# Patient Record
Sex: MALE | Race: WHITE | NOT HISPANIC OR LATINO | Employment: OTHER | URBAN - METROPOLITAN AREA
[De-identification: names, ages, dates, MRNs, and addresses within clinical notes are randomized per-mention and may not be internally consistent; named-entity substitution may affect disease eponyms.]

---

## 2023-01-01 ENCOUNTER — PRE VISIT (OUTPATIENT)
Dept: TRANSPLANT | Facility: CLINIC | Age: 83
End: 2023-01-01
Payer: MEDICARE

## 2023-01-01 ENCOUNTER — VIRTUAL VISIT (OUTPATIENT)
Dept: TRANSPLANT | Facility: CLINIC | Age: 83
End: 2023-01-01
Attending: INTERNAL MEDICINE
Payer: MEDICARE

## 2023-01-01 ENCOUNTER — PATIENT OUTREACH (OUTPATIENT)
Dept: ONCOLOGY | Facility: CLINIC | Age: 83
End: 2023-01-01
Payer: MEDICARE

## 2023-01-01 ENCOUNTER — TRANSCRIBE ORDERS (OUTPATIENT)
Dept: OTHER | Age: 83
End: 2023-01-01

## 2023-01-01 ENCOUNTER — PATIENT OUTREACH (OUTPATIENT)
Dept: ONCOLOGY | Facility: CLINIC | Age: 83
End: 2023-01-01

## 2023-01-01 ENCOUNTER — NURSE TRIAGE (OUTPATIENT)
Dept: ONCOLOGY | Facility: CLINIC | Age: 83
End: 2023-01-01
Payer: MEDICARE

## 2023-01-01 DIAGNOSIS — D64.9 ANEMIA, UNSPECIFIED TYPE: ICD-10-CM

## 2023-01-01 DIAGNOSIS — C83.18 MANTLE CELL LYMPHOMA OF LYMPH NODES OF MULTIPLE SITES (H): Primary | ICD-10-CM

## 2023-01-01 DIAGNOSIS — E83.111 IRON OVERLOAD DUE TO REPEATED RED BLOOD CELL TRANSFUSIONS: ICD-10-CM

## 2023-01-01 DIAGNOSIS — Z94.81 HISTORY OF BONE MARROW TRANSPLANT (H): ICD-10-CM

## 2023-01-01 DIAGNOSIS — R79.89 HIGH SERUM FERRITIN: Primary | ICD-10-CM

## 2023-01-01 DIAGNOSIS — Z85.72 HISTORY OF LYMPHOMA: ICD-10-CM

## 2023-01-01 PROCEDURE — G0463 HOSPITAL OUTPT CLINIC VISIT: HCPCS | Mod: PN,GT | Performed by: INTERNAL MEDICINE

## 2023-01-01 PROCEDURE — 99205 OFFICE O/P NEW HI 60 MIN: CPT | Mod: VID | Performed by: INTERNAL MEDICINE

## 2023-01-01 RX ORDER — DILTIAZEM HYDROCHLORIDE 120 MG/1
120 CAPSULE, EXTENDED RELEASE ORAL
COMMUNITY
Start: 2023-01-01

## 2023-01-01 RX ORDER — THYROID 60 MG/1
60 TABLET ORAL
COMMUNITY
Start: 2023-01-01

## 2023-01-01 RX ORDER — FUROSEMIDE 20 MG
40 TABLET ORAL
COMMUNITY
Start: 2023-01-01 | End: 2024-01-24

## 2023-01-12 NOTE — PROGRESS NOTES
[facility]: Cancer Care                                                                                          Received voice mail message from pt's wife, Jacque, requesting a call back as they would like a second opinion (or consult) with Dr. Rigoberto Wade about Luther.  She also stated she has written a letter with request and asking if she should mail it (needs up to dated address) or e-mail.  Not clear if pt needs to be seen by malignant or benign hematologist.    Per chart review with Care Everywhere, pt seeing Dr. Ulysses Gardner, [facility] in [place].  Per Dr. 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  Iron overload secondary to frequent transfusions.    Per a 1/21/2010 note by Dr. Yadiel Perry, pt was, at that time 4 yrs post autologous transplant for an aggressive mantle cell lymphoma.    RN sent high priority message to Cancer Nurse Navigation team and copied Dr. Rigoberto Wade though do not see an note from him in chart.    Signature:  Nadine Rivers, YADI, OCN

## 2023-01-17 NOTE — PROGRESS NOTES
Writer SAMIR for pt's wife at number provided: [phone].  Indicated call back number to discuss referral further  Nallely Perez, RN, BSN, OCN  Hematology/Oncology Nurse Navigator  [facility]  [phone] / [phone]

## 2023-02-09 NOTE — TELEPHONE ENCOUNTER
Action 2023 9:11 AM ABT   Action Taken Called and spoke to Hoda with [facility] imaging team, she states that she will push all [id number] PET and CT Abd Pel to  PACS     RECORDS STATUS - ALL OTHER DIAGNOSIS      RECORDS RECEIVED FROM: [facility]   DATE RECEIVED:    NOTES STATUS DETAILS   OFFICE NOTE from referring provider Self    OFFICE NOTE from medical oncologist On license of [facility] 23: Dr. Ulysses Gardner   OFFICE NOTE from other specialist     OPERATIVE REPORT On license of [facility] 17, 05, 05: Bone Marrow biopsy   MEDICATION LIST On license of [facility]    LABS     PATHOLOGY REPORTS Req -[facility]  [employer] 17: [id number]  05: [id number]  05: [id number]   ANYTHING RELATED TO DIAGNOSIS CE-[facility] Most recent 23   IMAGING (NEED IMAGES & REPORT)     CT SCANS PACS [facility]:  22-18: CT Chest Abd Pel  09/15/22: CT Pelvis  18: CT Abd Pel   PET PACS [facility]:  22-17: NM PET CT Skull

## 2023-02-09 NOTE — PROGRESS NOTES
Writer placed call to Jacque, wife, who called in referral for Luther. He previously saw Micaela Perry and Mauro for mantle cell lymphoma but has since transferred care to local provider within the [facility] system. Has been recently transfusion dependent and now with significant iron overload. Jacque is asking to see Dr Wade again as he previously treated Luther.    Hold placed on 2/13 at 3:00 pm for 60 minutes with Dr Wade and message to care team to ask if ok for virtual for initial visit. Will update scheduling instructions based on response.

## 2023-02-13 NOTE — LETTER
Date:February 14, 2023      Patient was self referred, no letter generated. Do not send.        [facility] Health Information

## 2023-02-13 NOTE — NURSING NOTE
Pharmacy should be [facility] in [place], [place]    Is the patient currently in the state of [place]? YES    Visit mode:VIDEO    If the visit is dropped, the patient can be reconnected by: VIDEO VISIT: Text to cell phone: [phone]    Will anyone else be joining the visit? YES: How would they like to receive their invitation?       How would you like to obtain your AVS? MyChart    Are changes needed to the allergy or medication list? YES: Add  to medication - Deferasirox    Comments or concerns regarding today's visit: MARGARITO Viera  2/13/2023

## 2023-02-13 NOTE — LETTER
2/13/2023         RE: Luther Witt  [address]  [place] [address]        Dear Colleague,    Thank you for referring your patient, Luther Witt, to the [facility] [place]. Please see a copy of my visit note below.    Video-Visit Details    Type of service:  Video Visit    Video Start Time (time video started): 300    Video End Time (time video stopped): 400    Originating Location (pt. Location): home        Distant Location (provider location):  [facility] [place] [facility]    Mode of Communication:  Video Conference via [facility]       Hematology/Oncology Consult Note    Luther Witt MRN# [id number]   Age: 82 year old YOB: 1940          Reason for Consult:   Iron overload         Assessment and Plan:   Luther Witt is a 82 year old   ASSESSMENT:    1.  Refractory anemia, transfusion dependent.  2.  Iron overload secondary to transfusions.  3.  Mantle cell lymphoma, status post autologous bone marrow transplant, status post relapse treatment with acalabrutinib, Rituxan, bendamustine and obinutuzumab now in CR.  4.  Congestive heart failure.  5.  Stage IIIB chronic kidney disease.  6.  Hypotestosteronemia.  7.  Hip pain.  8.  Weight loss.  9.  Memory issues.  10.  Severe fatigue.  11. Thrombocytopenia      PLAN:    It was nice to see Luther and Jacque, but clearly he has markedly deteriorated.  He has remarkable fatigue and brain fog, memory deficits and he is transfusion dependent.  I just am not sure why is he so anemic.  Is this an aplastic process, is it an MDS process or is it an immune process?  I did note that at one point, he did have a positive ARIELLA.  His bilirubin has not been up that high and I could not find a retic count.  I would like to ask Dr. Zhu to get a ARIELLA, a retic count and possibly a bone marrow.  I am very suspicious that he has MDS given all of the treatments that he has had with alkylating agents and radiation, etc.  I  wonder whether or not a peripheral blood NGS might be done as well as a peripheral blood flow cytometry again.His mantle cell lymphoma seems to be in CR  In terms of his iron overload, there just are not that many good options.  He has CKD.  He is doing that deferasirox, but that small amount of deferasirox is overwhelmed by 2 units of packed red blood cells every 2 weeks.  He receives over 500 mg of iron with the red cell transfusions.  So, asking the iron chelator to try to remove that much is just not going to happen, especially since the deferasirox is affecting his kidney and the clearance of the deferasirox iron chelate is going to be affected.  I am going to give Dr. Zhu a call, and we will set up another appointment with Luther and see whether or not we can do anything about his refractory anemia.  I cannot tell if he is receiving darbepoetin anymore.  He certainly has CKD and could have a low erythropoietin, and I would think that erythropoietin may be helpful to him.  He should also get folate and try to avoid any iron supplements.        Thank you for involving us in the care of this patient.   I spent a total of 60 minutes face to face with Luther Witt during today's office visit. Over 50% of this time was spent counseling the patient and coordinating the care regarding refractory anemia and iron overload  and 40 minutes preparing to see the patient and  care coordination     Gabino Wade MD  [phone]         History of Present Illness:   History obtained from chart review and confirmed with patient.  HISTORY OF PRESENT ILLNESS:  I had the pleasure of seeing Luther Witt for evaluation of mantle cell lymphoma, refractory anemia and iron overload.  Luther is an 82-year-old gentleman who developed lymphadenopathy and splenomegaly in 04/2005.  He was diagnosed with mantle cell lymphoma.  He received 6 cycles of CHOP chemotherapy and after chemotherapy, he underwent an autologous bone  marrow transplant using Cytoxan and radiation.  He did well until 2017 when a bone marrow biopsy again showed mantle cell lymphoma.  It was a normal cellular bone marrow with 20-30% cellularity and actually decreased storage iron and 31% involvement with lymphoma.  He was treated with bendamustine and rituximab.  PET scan showed a complete response.  In 03/2021, he again showed progression of his lymphoma.  He was treated with acalabrutinib and then subsequently with bendamustine and obinutuzumab from 06/2021 through 07/2022.  He developed transfusion-dependent anemia back in 07/2021, and has had 1-2 units of red cells every couple of weeks.  He was getting subcutaneous darbepoetin back in 02/2022.  He did develop some congestive heart failure and pleural effusions with a decreased ejection fraction of 41%.  Restaging CT in 07/2022 showed that he had a left pleural effusion as well as a right pleural effusion.  This was tapped, but no lymphoma was found.  A PET/CT in 09/2022 showed no evidence of metastatic disease.  He was hospitalized with heart failure in 11/2022.  He was noted to have an elevated ferritin of 3242, and has been followed by Dr. Zhu with regular transfusions.  He has also had some mild CKD with creatinine up to 1.85, and decreased GFR below 30.  He has been treated with deferasirox 360 mg as Jadenu sprinkles.  He also has been given some testosterone.  Of note, 3 years ago a NGS for myeloid neoplasms was done with a broad sequencing analysis and no abnormal mutations consistent with MDS were found.  I cannot see that he has had it done recently.  Flow cytometry has been done, which did not show any evidence of peripheralized lymphoma.  A recent CBC showed that his hemoglobin was 7.4, MCV of 102, platelet count 85,000 with 88% neutrophils, 3% lymphocytes, 6% monocytes and 0.2% eosinophils.  His ferritin on 02/11/2023 was 4985.             Review of Systems:   A comprehensive ROS was performed  with the patient and was found to be negative with the exception of that noted in the HPI above.  CONSTITUTIONAL Weakness, fatigue  and wt loss 30 lb over last few months Very sleepy  EYES no cataracts  EARS poor hearing  SINUS No rhinitis  RESP  WATKINS  And SOB at rest no sleep apnea  Has a cough  ID Had COVID 2 months  COR CHF with decreased EF 41% No angina  GI no heart burn no diarrhea no constipations No bleeding or melena  MS No arthritis no joint pain  NEURO No headaches. Memory issues , forgetting  PSYCH mood ok  ENDO No DM Has low testosterone  r   Mantle cell lymphoma  Auto BMT  CHF  CKD           Past Surgical History:   No surgery         Social History:   No smoke or drink  Social History     Socioeconomic History     Marital status:      Spouse name: Not on file     Number of children: Not on file     Years of education: Not on file     Highest education level: Not on file   Occupational History     Not on file   Tobacco Use     Smoking status: Never     Smokeless tobacco: Never   Vaping Use     Vaping Use: Never used   Substance and Sexual Activity     Alcohol use: Not on file     Drug use: Not on file     Sexual activity: Not on file   Other Topics Concern     Not on file   Social History Narrative     Not on file     Social Determinants of Health     Financial Resource Strain: Not on file   Food Insecurity: Not on file   Transportation Needs: Not on file   Physical Activity: Not on file   Stress: Not on file   Social Connections: Not on file   Intimate Partner Violence: Not on file   Housing Stability: Not on file            Family History:   Family hx of cancer unknown type in brothers   No Diabetes   Mother had Afib  No family history of iron overload         Allergies:   No Known Allergies         Medications:   (Not in a hospital admission)           Physical Exam:   PHYSICAL EXAMINATION:  By the video, he is a fatigued gentleman,dozing not very verbal, in no acute distress.     EYES:   Grossly normal to inspection, no discharge, erythema or icterus.   SKIN:  Visible skin is clear.  No significant rash or abnormal pigmentation.   NEUROLOGIC:  Cranial nerves seem to be intact.  Mentation and speech sounds faigued   PSYCHIATRIC:  Mentation appears sluggish.          Data:   I have personally reviewed the following labs/imaging:  CBC@LABRCNTIPR(wbc:4,rbc:4,hgb:4,hct:4,mcv:4,mch:4,mchc:4,rdw:4,plt:4)@  CMP@LABRCNTIPR(na:4,potassium:4,chloride:4,co2:4,aniongap:4,g,bun:4,cr:4,gfrestimated:4,gfrestblack:4,sherif:4,ma,phos:4,prottotal:4,albumin:4,bilitotal:4,alkphos:4,ast:4,alt:4)@  INR@LABRCNTIPR(inr:4)@        Again, thank you for allowing me to participate in the care of your patient.        Sincerely,        Gabino Wade MD

## 2023-02-13 NOTE — LETTER
Date:February 14, 2023      Provider requested that no letter be sent. Do not send.       [facility]

## 2023-02-13 NOTE — PROGRESS NOTES
[facility]: Cancer Care                                                                                          Per request of Dr. Rigoberto Wade reached out to [facility] to assist with connecting with Dr. Ulysses Gardner.  Per message from Saniya nurse, Dr. Gardner out of clinic this week and next week.  Can call back after that if still need to talk with Dr. Gardner.    Late entry on on 2/15/23:  Gave nurse, at [facility], Dr. Rigoberto Wade's cell number to assist with Micaela Gardner and Mauro connecting on Dr. Gardner return.      Updated Dr. Wade on 2/13/23.    Dr. Rigoberto Wade's consult visit with pt was virtual on 2/213/23, so author did not have opportunity to meet pt/wife.    Signature:  Nadine Rivers, RN, OCN

## 2023-02-13 NOTE — PROGRESS NOTES
Video-Visit Details    Type of service:  Video Visit    Video Start Time (time video started): 300    Video End Time (time video stopped): 400    Originating Location (pt. Location): home        Distant Location (provider location):  [facility] [place]    Mode of Communication:  Video Conference via [facility]       Hematology/Oncology Consult Note    Luther Witt MRN# [id number]   Age: 82 year old YOB: 1940          Reason for Consult:   Iron overload         Assessment and Plan:   Luther Witt is a 82 year old   ASSESSMENT:    1.  Refractory anemia, transfusion dependent.  2.  Iron overload secondary to transfusions.  3.  Mantle cell lymphoma, status post autologous bone marrow transplant, status post relapse treatment with acalabrutinib, Rituxan, bendamustine and obinutuzumab now in CR.  4.  Congestive heart failure.  5.  Stage IIIB chronic kidney disease.  6.  Hypotestosteronemia.  7.  Hip pain.  8.  Weight loss.  9.  Memory issues.  10.  Severe fatigue.  11. Thrombocytopenia      PLAN:    It was nice to see Felyle, but clearly he has markedly deteriorated.  He has remarkable fatigue and brain fog, memory deficits and he is transfusion dependent.  I just am not sure why is he so anemic.  Is this an aplastic process, is it an MDS process or is it an immune process?  I did note that at one point, he did have a positive ARIELLA.  His bilirubin has not been up that high and I could not find a retic count.  I would like to ask Dr. Zhu to get a ARIELLA, a retic count and possibly a bone marrow.  I am very suspicious that he has MDS given all of the treatments that he has had with alkylating agents and radiation, etc.  I wonder whether or not a peripheral blood NGS might be done as well as a peripheral blood flow cytometry again.His mantle cell lymphoma seems to be in CR  In terms of his iron overload, there just are not that many good options.  He has CKD.  He is doing that deferasirox, but that  small amount of deferasirox is overwhelmed by 2 units of packed red blood cells every 2 weeks.  He receives over 500 mg of iron with the red cell transfusions.  So, asking the iron chelator to try to remove that much is just not going to happen, especially since the deferasirox is affecting his kidney and the clearance of the deferasirox iron chelate is going to be affected.  I am going to give Dr. Zhu a call, and we will set up another appointment with Luther and see whether or not we can do anything about his refractory anemia.  I cannot tell if he is receiving darbepoetin anymore.  He certainly has CKD and could have a low erythropoietin, and I would think that erythropoietin may be helpful to him.  He should also get folate and try to avoid any iron supplements.        Thank you for involving us in the care of this patient.   I spent a total of 60 minutes face to face with Luther Witt during today's office visit. Over 50% of this time was spent counseling the patient and coordinating the care regarding refractory anemia and iron overload  and 40 minutes preparing to see the patient and  care coordination     Gabino Wade MD  [phone]         History of Present Illness:   History obtained from chart review and confirmed with patient.  HISTORY OF PRESENT ILLNESS:  I had the pleasure of seeing Luther Witt for evaluation of mantle cell lymphoma, refractory anemia and iron overload.  Luther is an 82-year-old gentleman who developed lymphadenopathy and splenomegaly in 04/2005.  He was diagnosed with mantle cell lymphoma.  He received 6 cycles of CHOP chemotherapy and after chemotherapy, he underwent an autologous bone marrow transplant using Cytoxan and radiation.  He did well until 2017 when a bone marrow biopsy again showed mantle cell lymphoma.  It was a normal cellular bone marrow with 20-30% cellularity and actually decreased storage iron and 31% involvement with lymphoma.  He was treated  with bendamustine and rituximab.  PET scan showed a complete response.  In 03/2021, he again showed progression of his lymphoma.  He was treated with acalabrutinib and then subsequently with bendamustine and obinutuzumab from 06/2021 through 07/2022.  He developed transfusion-dependent anemia back in 07/2021, and has had 1-2 units of red cells every couple of weeks.  He was getting subcutaneous darbepoetin back in 02/2022.  He did develop some congestive heart failure and pleural effusions with a decreased ejection fraction of 41%.  Restaging CT in 07/2022 showed that he had a left pleural effusion as well as a right pleural effusion.  This was tapped, but no lymphoma was found.  A PET/CT in 09/2022 showed no evidence of metastatic disease.  He was hospitalized with heart failure in 11/2022.  He was noted to have an elevated ferritin of 3242, and has been followed by Dr. Zhu with regular transfusions.  He has also had some mild CKD with creatinine up to 1.85, and decreased GFR below 30.  He has been treated with deferasirox 360 mg as Dorina alvainkvikki.  He also has been given some testosterone.  Of note, 3 years ago a NGS for myeloid neoplasms was done with a broad sequencing analysis and no abnormal mutations consistent with MDS were found.  I cannot see that he has had it done recently.  Flow cytometry has been done, which did not show any evidence of peripheralized lymphoma.  A recent CBC showed that his hemoglobin was 7.4, MCV of 102, platelet count 85,000 with 88% neutrophils, 3% lymphocytes, 6% monocytes and 0.2% eosinophils.  His ferritin on 02/11/2023 was 4985.             Review of Systems:   A comprehensive ROS was performed with the patient and was found to be negative with the exception of that noted in the HPI above.  CONSTITUTIONAL Weakness, fatigue  and wt loss 30 lb over last few months Very sleepy  EYES no cataracts  EARS poor hearing  SINUS No rhinitis  RESP  WATKINS  And SOB at rest no sleep apnea   Has a cough  ID Had COVID 2 months  COR CHF with decreased EF 41% No angina  GI no heart burn no diarrhea no constipations No bleeding or melena  MS No arthritis no joint pain  NEURO No headaches. Memory issues , forgetting  PSYCH mood ok  ENDO No DM Has low testosterone  r   Mantle cell lymphoma  Auto BMT  CHF  CKD           Past Surgical History:   No surgery         Social History:   No smoke or drink  Social History     Socioeconomic History     Marital status:      Spouse name: Not on file     Number of children: Not on file     Years of education: Not on file     Highest education level: Not on file   Occupational History     Not on file   Tobacco Use     Smoking status: Never     Smokeless tobacco: Never   Vaping Use     Vaping Use: Never used   Substance and Sexual Activity     Alcohol use: Not on file     Drug use: Not on file     Sexual activity: Not on file   Other Topics Concern     Not on file   Social History Narrative     Not on file     Social Determinants of Health     Financial Resource Strain: Not on file   Food Insecurity: Not on file   Transportation Needs: Not on file   Physical Activity: Not on file   Stress: Not on file   Social Connections: Not on file   Intimate Partner Violence: Not on file   Housing Stability: Not on file            Family History:   Family hx of cancer unknown type in brothers   No Diabetes   Mother had Afib  No family history of iron overload         Allergies:   No Known Allergies         Medications:   (Not in a hospital admission)           Physical Exam:   PHYSICAL EXAMINATION:  By the video, he is a fatigued gentleman,dozing not very verbal, in no acute distress.     EYES:  Grossly normal to inspection, no discharge, erythema or icterus.   SKIN:  Visible skin is clear.  No significant rash or abnormal pigmentation.   NEUROLOGIC:  Cranial nerves seem to be intact.  Mentation and speech sounds faigued   PSYCHIATRIC:  Mentation appears sluggish.          Data:    I have personally reviewed the following labs/imaging:  CBC@LABRCNTIPR(wbc:4,rbc:4,hgb:4,hct:4,mcv:4,mch:4,mchc:4,rdw:4,plt:4)@  CMP@LABRCNTIPR(na:4,potassium:4,chloride:4,co2:4,aniongap:4,g,bun:4,cr:4,gfrestimated:4,gfrestblack:4,sherif:4,ma,phos:4,prottotal:4,albumin:4,bilitotal:4,alkphos:4,ast:4,alt:4)@  INR@LABRCNTIPR(inr:4)@

## 2023-02-13 NOTE — LETTER
February 14, 2023       TO: Luther Witt  [address]  [place] [address]       DearMr.Miryam,    We are writing to inform you of your test results.    {[facility] results letter list:[id number]}    No results found from the In Basket message.    ***

## 2023-02-14 NOTE — TELEPHONE ENCOUNTER
"Oncology Nurse Triage    Situation:   Luther reporting the following symptoms: weakness and shortness of breath that is progressively worsening. Pt has anemia. Pt has high ferritin levels (4,985.3 on 2/11/23) and questions if this is causing worsening progression. She is also reporting fluid retention and poor appetite. Pt had COVID 2 months ago and has lingering cough.     Background:   Treating Provider: Dr. Wade    Date of last office visit: virtual visit 2/13/23    Recent Treatments: was in ED on 2/11/23 d/t chronic fatigue    Assessment:     She denies fever- temp last night was 98.6. She denies chills/cold symptoms/chest pain. Pt is so weak he is having a hard time to sit on stool. Urine is orange in color and decrease output. Pt has not been drinking or eating, taking only sips. She reports breathing is \"heavy and shallow\" and at night he is \"totally disorientated\". She said home health nurse had advised pt go in to ED and she is questioning if they should.     Recommendations:   Writer advised to take pt to ED to be evaluated. Jacque says she has a daughter with her, they will transport pt to ED in [place], [place]. Writer advised if it is too strenuous to get pt into car, to call 911, as to not hurt themselves. Jacque voiced understanding.     She wanted to say 2 months is too long to see Dr. Wade again. Writer will send message to care team and asked Jacque to keep team updated on ED visit.           "

## 2023-02-15 NOTE — PROGRESS NOTES
[facility]: Cancer Care                                                                                          Per chart review, including Care Everywhere, pt did go to ED on 2/14/23 as advised.  Pt admitted and presently at [facility] per Care Everywhere.  Called and spoke with pt's wife, Jacque, and briefly introduced self and role of RN Care Coordinator at [facility]. Provided my contact information, [facility] phone number (which has option to talk with a nurse available 24/7 - triage and RNCC via this option during business hours).   Discussed that Dr. Wade is planning to follow up with Dr. Gardner regarding pt's case.  Know they expressed concern about not having return appt until April, but also shared that Dr. Rigoberto Wade is very good about reaching out to pt's between visits as needed.  Stated RN thinking that is his plan after talking with Dr. Gardner.  RN sent update to Dr. Rigoberto Wade.    Signature:  Nadine Rivers RN, OCN

## 2023-02-17 NOTE — TELEPHONE ENCOUNTER
[facility]: Hematology                                                                                          Clinic coordinator received phone call from spouse reporting patient passed away today around 8a at [employer].    All appointments cancelled and care team informed.      Yajaira Almendarez LPN  Hematology Care Coordination  [phone]

## 2023-02-20 ENCOUNTER — PATIENT OUTREACH (OUTPATIENT)
Dept: ONCOLOGY | Facility: CLINIC | Age: 83
End: 2023-02-20
Payer: MEDICARE